# Patient Record
Sex: FEMALE | URBAN - METROPOLITAN AREA
[De-identification: names, ages, dates, MRNs, and addresses within clinical notes are randomized per-mention and may not be internally consistent; named-entity substitution may affect disease eponyms.]

---

## 2024-06-18 ENCOUNTER — APPOINTMENT (RX ONLY)
Dept: URBAN - METROPOLITAN AREA CLINIC 47 | Facility: CLINIC | Age: 34
Setting detail: DERMATOLOGY
End: 2024-06-18

## 2024-06-18 DIAGNOSIS — L98.8 OTHER SPECIFIED DISORDERS OF THE SKIN AND SUBCUTANEOUS TISSUE: ICD-10-CM

## 2024-06-18 PROCEDURE — ? ADDITIONAL NOTES

## 2024-06-18 PROCEDURE — ? DYSPORT (U OR CC)

## 2024-06-18 PROCEDURE — ? XEOMIN (U OR CC)

## 2024-06-18 PROCEDURE — ? PHOTO-DOCUMENTATION

## 2024-06-18 NOTE — PROCEDURE: ADDITIONAL NOTES
Render Risk Assessment In Note?: no
Additional Notes: Xeomin $154 minus $50 xp coupon = 104
Detail Level: Simple

## 2024-06-18 NOTE — PROCEDURE: XEOMIN (U OR CC)
Inferior Lateral Orbicularis Oculi Units: 0
Lot #: [id number]
Price (Use Numbers Only, No Special Characters Or $): 154
Post-Care Instructions: Patient instructed to not lie down for 4 hours and limit physical activity for 24 hours.
Additional Area 2 Location: lipflip
Depressor Anguli Oris Units: 8
Additional Area 1 Location: 
Detail Level: Detailed
Expiration Date (Month Year): 7/26
Dilution (U/0.1 Cc): 2.5
Consent: Written consent obtained. Risks include but not limited to lid/brow ptosis, bruising, swelling, diplopia, temporary effect, incomplete chemical denervation.
Additional Area 1 Units: 7

## 2024-06-18 NOTE — PROCEDURE: DYSPORT (U OR CC)
Forehead Units: 15
Inferior Lateral Masseter Units: 0
Lot #: [id number]
Post-Care Instructions: Patient instructed to not lie down for 4 hours and limit physical activity for 24 hours. No alcohol for 24 hrs. Advised patient to take antihistamines for 3-5 days.
Inferior Lateral Orbicularis Oculi Units: 14
Additional Area 1 Location: lip flip
Price (Use Numbers Only, No Special Characters Or $): 543
Consent: Written consent obtained. Risks include but not limited to lid/brow ptosis, bruising, swelling, diplopia, temporary effect, incomplete chemical denervation.
Dilution (U/0.1 Cc): 3
Detail Level: Detailed
Additional Comments: minus $20 aspire = $464
Expiration Date (Month Year): 12/25

## 2024-07-02 ENCOUNTER — APPOINTMENT (RX ONLY)
Dept: URBAN - METROPOLITAN AREA CLINIC 47 | Facility: CLINIC | Age: 34
Setting detail: DERMATOLOGY
End: 2024-07-02

## 2024-07-02 DIAGNOSIS — Z41.9 ENCOUNTER FOR PROCEDURE FOR PURPOSES OTHER THAN REMEDYING HEALTH STATE, UNSPECIFIED: ICD-10-CM

## 2024-07-02 PROCEDURE — ? COSMETIC FOLLOW-UP

## 2024-10-15 ENCOUNTER — APPOINTMENT (RX ONLY)
Dept: URBAN - METROPOLITAN AREA CLINIC 47 | Facility: CLINIC | Age: 34
Setting detail: DERMATOLOGY
End: 2024-10-15

## 2024-10-15 DIAGNOSIS — L98.8 OTHER SPECIFIED DISORDERS OF THE SKIN AND SUBCUTANEOUS TISSUE: ICD-10-CM

## 2024-10-15 PROCEDURE — ? DYSPORT (U OR CC)

## 2024-10-15 PROCEDURE — ? ADDITIONAL NOTES

## 2024-10-15 PROCEDURE — ? XEOMIN (U OR CC)

## 2024-10-15 PROCEDURE — ? PHOTO-DOCUMENTATION

## 2024-10-15 NOTE — PROCEDURE: DYSPORT (U OR CC)
Nasal Root Units: 0
Lot #: [id number]
Additional Area 1 Location: lip flip
Additional Comments: minus $20 aspire
Post-Care Instructions: Patient instructed to not lie down for 4 hours and limit physical activity for 24 hours. No alcohol for 24 hrs. Advised patient to take antihistamines for 3-5 days.
Inferior Lateral Orbicularis Oculi Units: 16
Expiration Date (Month Year): 3/26
Dilution (U/0.1 Cc): 3
Forehead Units: 45
Detail Level: Detailed
Consent: Written consent obtained. Risks include but not limited to lid/brow ptosis, bruising, swelling, diplopia, temporary effect, incomplete chemical denervation.
Price (Use Numbers Only, No Special Characters Or $): 491

## 2024-10-15 NOTE — PROCEDURE: XEOMIN (U OR CC)
Inferior Lateral Masseter Units: 0
Lot #: [id number]
Depressor Anguli Oris Units: 8
Consent: Written consent obtained. Risks include but not limited to lid/brow ptosis, bruising, swelling, diplopia, temporary effect, incomplete chemical denervation.
Detail Level: Detailed
Expiration Date (Month Year): 10/26
Additional Area 2 Location: lipflip
Dilution (U/0.1 Cc): 2.5
Additional Area 1 Location: chin
Post-Care Instructions: Patient instructed to not lie down for 4 hours and limit physical activity for 24 hours.
Price (Use Numbers Only, No Special Characters Or $): 140
Additional Area 1 Units: 6